# Patient Record
Sex: FEMALE | Race: WHITE | NOT HISPANIC OR LATINO | Employment: UNEMPLOYED | URBAN - NONMETROPOLITAN AREA
[De-identification: names, ages, dates, MRNs, and addresses within clinical notes are randomized per-mention and may not be internally consistent; named-entity substitution may affect disease eponyms.]

---

## 2020-08-13 ENCOUNTER — APPOINTMENT (OUTPATIENT)
Dept: CT IMAGING | Facility: HOSPITAL | Age: 70
End: 2020-08-13

## 2020-08-13 ENCOUNTER — HOSPITAL ENCOUNTER (EMERGENCY)
Facility: HOSPITAL | Age: 70
Discharge: HOME | End: 2020-08-14
Attending: FAMILY MEDICINE | Admitting: EMERGENCY MEDICINE

## 2020-08-13 ENCOUNTER — APPOINTMENT (OUTPATIENT)
Dept: GENERAL RADIOLOGY | Facility: HOSPITAL | Age: 70
End: 2020-08-13

## 2020-08-13 DIAGNOSIS — F23 ACUTE PSYCHOSIS (HCC): Primary | ICD-10-CM

## 2020-08-13 DIAGNOSIS — F51.02 ADJUSTMENT INSOMNIA: ICD-10-CM

## 2020-08-13 LAB
ALBUMIN SERPL-MCNC: 4.55 G/DL (ref 3.5–5.2)
ALBUMIN/GLOB SERPL: 1.4 G/DL
ALP SERPL-CCNC: 173 U/L (ref 39–117)
ALT SERPL W P-5'-P-CCNC: 11 U/L (ref 1–33)
ANION GAP SERPL CALCULATED.3IONS-SCNC: 13.6 MMOL/L (ref 5–15)
APAP SERPL-MCNC: <5 MCG/ML (ref 10–30)
AST SERPL-CCNC: 18 U/L (ref 1–32)
BACTERIA UR QL AUTO: ABNORMAL /HPF
BASOPHILS # BLD AUTO: 0.08 10*3/MM3 (ref 0–0.2)
BASOPHILS NFR BLD AUTO: 0.8 % (ref 0–1.5)
BILIRUB SERPL-MCNC: 0.2 MG/DL (ref 0–1.2)
BILIRUB UR QL STRIP: NEGATIVE
BUN SERPL-MCNC: 8 MG/DL (ref 8–23)
BUN/CREAT SERPL: 8.4 (ref 7–25)
CALCIUM SPEC-SCNC: 9.5 MG/DL (ref 8.6–10.5)
CHLORIDE SERPL-SCNC: 105 MMOL/L (ref 98–107)
CLARITY UR: CLEAR
CO2 SERPL-SCNC: 23.4 MMOL/L (ref 22–29)
COLOR UR: YELLOW
CREAT SERPL-MCNC: 0.95 MG/DL (ref 0.57–1)
CRP SERPL-MCNC: 0.14 MG/DL (ref 0–0.5)
D-LACTATE SERPL-SCNC: 1.7 MMOL/L (ref 0.5–2)
DEPRECATED RDW RBC AUTO: 52.2 FL (ref 37–54)
EOSINOPHIL # BLD AUTO: 0.12 10*3/MM3 (ref 0–0.4)
EOSINOPHIL NFR BLD AUTO: 1.3 % (ref 0.3–6.2)
ERYTHROCYTE [DISTWIDTH] IN BLOOD BY AUTOMATED COUNT: 17.2 % (ref 12.3–15.4)
ETHANOL BLD-MCNC: <10 MG/DL (ref 0–10)
ETHANOL UR QL: <0.01 %
GFR SERPL CREATININE-BSD FRML MDRD: 58 ML/MIN/1.73
GLOBULIN UR ELPH-MCNC: 3.2 GM/DL
GLUCOSE SERPL-MCNC: 99 MG/DL (ref 65–99)
GLUCOSE UR STRIP-MCNC: NEGATIVE MG/DL
HCT VFR BLD AUTO: 38.8 % (ref 34–46.6)
HGB BLD-MCNC: 11.7 G/DL (ref 12–15.9)
HGB UR QL STRIP.AUTO: NEGATIVE
HOLD SPECIMEN: NORMAL
HOLD SPECIMEN: NORMAL
HYALINE CASTS UR QL AUTO: ABNORMAL /LPF
IMM GRANULOCYTES # BLD AUTO: 0.03 10*3/MM3 (ref 0–0.05)
IMM GRANULOCYTES NFR BLD AUTO: 0.3 % (ref 0–0.5)
KETONES UR QL STRIP: NEGATIVE
LEUKOCYTE ESTERASE UR QL STRIP.AUTO: ABNORMAL
LYMPHOCYTES # BLD AUTO: 2.94 10*3/MM3 (ref 0.7–3.1)
LYMPHOCYTES NFR BLD AUTO: 30.8 % (ref 19.6–45.3)
MAGNESIUM SERPL-MCNC: 2.1 MG/DL (ref 1.6–2.4)
MCH RBC QN AUTO: 25.4 PG (ref 26.6–33)
MCHC RBC AUTO-ENTMCNC: 30.2 G/DL (ref 31.5–35.7)
MCV RBC AUTO: 84.3 FL (ref 79–97)
MONOCYTES # BLD AUTO: 0.99 10*3/MM3 (ref 0.1–0.9)
MONOCYTES NFR BLD AUTO: 10.4 % (ref 5–12)
NEUTROPHILS NFR BLD AUTO: 5.38 10*3/MM3 (ref 1.7–7)
NEUTROPHILS NFR BLD AUTO: 56.4 % (ref 42.7–76)
NITRITE UR QL STRIP: NEGATIVE
NRBC BLD AUTO-RTO: 0 /100 WBC (ref 0–0.2)
PH UR STRIP.AUTO: 7 [PH] (ref 5–8)
PLATELET # BLD AUTO: 372 10*3/MM3 (ref 140–450)
PMV BLD AUTO: 9.9 FL (ref 6–12)
POTASSIUM SERPL-SCNC: 3.7 MMOL/L (ref 3.5–5.2)
PROT SERPL-MCNC: 7.7 G/DL (ref 6–8.5)
PROT UR QL STRIP: NEGATIVE
RBC # BLD AUTO: 4.6 10*6/MM3 (ref 3.77–5.28)
RBC # UR: ABNORMAL /HPF
REF LAB TEST METHOD: ABNORMAL
SALICYLATES SERPL-MCNC: <0.3 MG/DL
SODIUM SERPL-SCNC: 142 MMOL/L (ref 136–145)
SP GR UR STRIP: 1.01 (ref 1–1.03)
SQUAMOUS #/AREA URNS HPF: ABNORMAL /HPF
TROPONIN T SERPL-MCNC: <0.01 NG/ML (ref 0–0.03)
TSH SERPL DL<=0.05 MIU/L-ACNC: 1.18 UIU/ML (ref 0.27–4.2)
UROBILINOGEN UR QL STRIP: ABNORMAL
WBC # BLD AUTO: 9.54 10*3/MM3 (ref 3.4–10.8)
WBC UR QL AUTO: ABNORMAL /HPF
WHOLE BLOOD HOLD SPECIMEN: NORMAL
WHOLE BLOOD HOLD SPECIMEN: NORMAL

## 2020-08-13 PROCEDURE — 71045 X-RAY EXAM CHEST 1 VIEW: CPT

## 2020-08-13 PROCEDURE — 80307 DRUG TEST PRSMV CHEM ANLYZR: CPT | Performed by: PHYSICIAN ASSISTANT

## 2020-08-13 PROCEDURE — 80053 COMPREHEN METABOLIC PANEL: CPT | Performed by: PHYSICIAN ASSISTANT

## 2020-08-13 PROCEDURE — 87040 BLOOD CULTURE FOR BACTERIA: CPT | Performed by: PHYSICIAN ASSISTANT

## 2020-08-13 PROCEDURE — 93010 ELECTROCARDIOGRAM REPORT: CPT | Performed by: INTERNAL MEDICINE

## 2020-08-13 PROCEDURE — 70450 CT HEAD/BRAIN W/O DYE: CPT | Performed by: RADIOLOGY

## 2020-08-13 PROCEDURE — 70450 CT HEAD/BRAIN W/O DYE: CPT

## 2020-08-13 PROCEDURE — 85025 COMPLETE CBC W/AUTO DIFF WBC: CPT | Performed by: PHYSICIAN ASSISTANT

## 2020-08-13 PROCEDURE — 99284 EMERGENCY DEPT VISIT MOD MDM: CPT

## 2020-08-13 PROCEDURE — 81001 URINALYSIS AUTO W/SCOPE: CPT | Performed by: PHYSICIAN ASSISTANT

## 2020-08-13 PROCEDURE — 83605 ASSAY OF LACTIC ACID: CPT | Performed by: PHYSICIAN ASSISTANT

## 2020-08-13 PROCEDURE — 25010000002 LORAZEPAM PER 2 MG: Performed by: FAMILY MEDICINE

## 2020-08-13 PROCEDURE — 71045 X-RAY EXAM CHEST 1 VIEW: CPT | Performed by: RADIOLOGY

## 2020-08-13 PROCEDURE — P9612 CATHETERIZE FOR URINE SPEC: HCPCS

## 2020-08-13 PROCEDURE — 84484 ASSAY OF TROPONIN QUANT: CPT | Performed by: PHYSICIAN ASSISTANT

## 2020-08-13 PROCEDURE — 84443 ASSAY THYROID STIM HORMONE: CPT | Performed by: PHYSICIAN ASSISTANT

## 2020-08-13 PROCEDURE — 86140 C-REACTIVE PROTEIN: CPT | Performed by: PHYSICIAN ASSISTANT

## 2020-08-13 PROCEDURE — 96374 THER/PROPH/DIAG INJ IV PUSH: CPT

## 2020-08-13 PROCEDURE — 83735 ASSAY OF MAGNESIUM: CPT | Performed by: PHYSICIAN ASSISTANT

## 2020-08-13 PROCEDURE — 93005 ELECTROCARDIOGRAM TRACING: CPT | Performed by: PHYSICIAN ASSISTANT

## 2020-08-13 RX ORDER — CLONIDINE HYDROCHLORIDE 0.1 MG/1
0.2 TABLET ORAL ONCE
Status: COMPLETED | OUTPATIENT
Start: 2020-08-13 | End: 2020-08-13

## 2020-08-13 RX ORDER — LORAZEPAM 2 MG/ML
1 INJECTION INTRAMUSCULAR ONCE
Status: COMPLETED | OUTPATIENT
Start: 2020-08-13 | End: 2020-08-13

## 2020-08-13 RX ORDER — SODIUM CHLORIDE 0.9 % (FLUSH) 0.9 %
10 SYRINGE (ML) INJECTION AS NEEDED
Status: DISCONTINUED | OUTPATIENT
Start: 2020-08-13 | End: 2020-08-14 | Stop reason: HOSPADM

## 2020-08-13 RX ADMIN — LORAZEPAM 1 MG: 2 INJECTION INTRAMUSCULAR; INTRAVENOUS at 20:19

## 2020-08-13 RX ADMIN — SODIUM CHLORIDE 1000 ML: 9 INJECTION, SOLUTION INTRAVENOUS at 20:19

## 2020-08-13 RX ADMIN — CLONIDINE HYDROCHLORIDE 0.2 MG: 0.1 TABLET ORAL at 20:19

## 2020-08-13 NOTE — ED NOTES
Daughter has pt back in triage, lead nurse contacted, room obtained      Lara Houston, RN  08/13/20 1514

## 2020-08-13 NOTE — ED NOTES
Report to YESSI Diaz, pt is alert, talking to daughter, remains very anxious and restless, got herself out of wc onto stretcher, sheet given     Lara Houston RN  08/13/20 1914

## 2020-08-13 NOTE — ED NOTES
Fall bracelet placed on pt and education given to her and daughter     Rosemarie Lara Samaria, RN  08/13/20 5168

## 2020-08-13 NOTE — ED NOTES
Spoke to lead nurse, attempting to find room placement for pt at this time due to high pt volume     Lara Houston, RN  08/13/20 9230

## 2020-08-13 NOTE — ED NOTES
Spoke with Irina link supervisor @ [facility] and requested records. Faxed medical records release to [phone].      Symes, Heather  08/13/20 9607

## 2020-08-13 NOTE — ED NOTES
Unable to obtain IV access at this time; Ria LARA aware. She states it is ok for pt to go to CT.      Tana Mansfield, RN  08/13/20 1950

## 2020-08-14 VITALS
SYSTOLIC BLOOD PRESSURE: 139 MMHG | BODY MASS INDEX: 22.45 KG/M2 | DIASTOLIC BLOOD PRESSURE: 95 MMHG | HEIGHT: 62 IN | OXYGEN SATURATION: 99 % | RESPIRATION RATE: 20 BRPM | HEART RATE: 108 BPM | WEIGHT: 122 LBS | TEMPERATURE: 98 F

## 2020-08-14 LAB
6-ACETYL MORPHINE: NEGATIVE
AMPHET+METHAMPHET UR QL: NEGATIVE
BARBITURATES UR QL SCN: NEGATIVE
BENZODIAZ UR QL SCN: POSITIVE
BUPRENORPHINE SERPL-MCNC: NEGATIVE NG/ML
CANNABINOIDS SERPL QL: NEGATIVE
COCAINE UR QL: NEGATIVE
METHADONE UR QL SCN: NEGATIVE
OPIATES UR QL: NEGATIVE
OXYCODONE UR QL SCN: NEGATIVE
PCP UR QL SCN: NEGATIVE

## 2020-08-14 NOTE — ED NOTES
Pt resting quietly on stretcher with improving complaints of anxiety.  Pt AAOx4 with no resp distress noted, respirations even and unlabored.  Pt denies any needs at this time.  Skin PWD.  Pt family at bedside. Will continue to monitor and follow plan of care.  Bed rails up x2, bed in lowest position, call light in reach.           Tana Mansfield, RN  08/14/20 3142

## 2020-08-14 NOTE — NURSING NOTE
Patient presents to ED for insomnia. Psych evaluation ordered per physician request. Patient denies SI/HI and AVH. Appetite poor. Patient rates anxiety 10 and depression 10 on a scale from 0-10. A&Ox3. Denies pain.

## 2020-08-14 NOTE — ED PROVIDER NOTES
Subjective   69-year-old female who presents to the ED today with her daughter for a mental health evaluation.  The patient reports that her son  8 years ago.  She is still struggling with grief due to this.  Her daughter states that his birthday is .  She states on  the patient's neighbor gave her some alcohol to try to help her sleep and the patient mixed it with medications for sleep.  The patient does not typically drink alcohol. This caused the patient to overdose and go into cardiac arrest.  She was seen at [facility] for this.  Upon discharge, her provider would no longer prescribe Klonopin.  Her daughter states she was taking 3 mg of Klonopin per day.  She had some leftover and she has been stretching them out to try to make them last.  Her daughter states the last time she had any Klonopin was 2 days ago.  Her provider has tried her on Vraylar and trazodone to try to help with her symptoms but there has been no relief.  The daughter reports that the patient has probably not slept in about 2 weeks.  She states if she does fall asleep but only for about 30 minutes at a time.  The patient denies any suicidal or homicidal ideations.  She states that she has been having frequent falls.  She states that she is having chills and sweats and body aches.  Her daughter states that she has been having issues with C. difficile for about a month.  She was restarted on p.o. vancomycin about 3 days ago.  She was also treated for pancreatitis and pneumonia during an admission to [facility] on .  She continues to have a cough.  The daughter reports that the patient has become more agitated and psychotic.  The patient denies any suicidal or homicidal ideations.      History provided by:  Patient and relative (daughter)  Mental Health Problem   Presenting symptoms: agitation    Presenting symptoms: no suicidal thoughts    Patient accompanied by:  Family member  Degree of incapacity  (severity):  Severe  Onset quality:  Gradual  Duration:  2 weeks  Timing:  Constant  Progression:  Worsening  Chronicity:  New  Context: recent medication change and stressful life event    Relieved by:  Nothing  Worsened by:  Lack of sleep  Associated symptoms: anxiety, appetite change, fatigue, insomnia and irritability    Associated symptoms: no abdominal pain    Risk factors: hx of mental illness        Review of Systems   Constitutional: Positive for appetite change, chills, diaphoresis, fatigue and irritability.   HENT: Negative.    Eyes: Negative.    Respiratory: Positive for cough. Negative for chest tightness, shortness of breath and wheezing.    Cardiovascular: Negative.    Gastrointestinal: Positive for diarrhea. Negative for abdominal pain, nausea and vomiting.   Genitourinary: Negative.    Musculoskeletal: Negative.    Skin: Negative.    Neurological: Negative.    Psychiatric/Behavioral: Positive for agitation and sleep disturbance. Negative for suicidal ideas. The patient is nervous/anxious and has insomnia.    All other systems reviewed and are negative.      History reviewed. No pertinent past medical history.    Allergies   Allergen Reactions   • Lyrica [Pregabalin] Other (See Comments)     Causes her to fall   • Statins Other (See Comments)     aches   • Codeine Rash       History reviewed. No pertinent surgical history.    History reviewed. No pertinent family history.    Social History     Socioeconomic History   • Marital status: Single     Spouse name: Not on file   • Number of children: Not on file   • Years of education: Not on file   • Highest education level: Not on file   Tobacco Use   • Smoking status: Current Every Day Smoker     Packs/day: 2.00     Years: 48.00     Pack years: 96.00   • Smokeless tobacco: Never Used   Substance and Sexual Activity   • Alcohol use: Not Currently   • Drug use: Not Currently   • Sexual activity: Not Currently           Objective   Physical Exam    Constitutional: She is oriented to person, place, and time. She appears well-developed and well-nourished.   HENT:   Head: Normocephalic and atraumatic.   Right Ear: External ear normal.   Left Ear: External ear normal.   Eyes: Pupils are equal, round, and reactive to light. Conjunctivae and EOM are normal.   Neck: Normal range of motion. Neck supple.   Cardiovascular: Regular rhythm, normal heart sounds and intact distal pulses. Tachycardia present.   Pulmonary/Chest: Effort normal and breath sounds normal. No respiratory distress. She has no wheezes. She exhibits no tenderness.   Has a mild cough   Abdominal: Soft. Bowel sounds are normal. There is no tenderness.   Musculoskeletal: Normal range of motion.   Neurological: She is alert and oriented to person, place, and time.   Skin: Skin is warm and dry. Capillary refill takes less than 2 seconds.   Psychiatric: Her mood appears anxious. Her speech is rapid and/or pressured and tangential. She is agitated and hyperactive. Thought content is paranoid. She expresses no homicidal and no suicidal ideation.   Nursing note and vitals reviewed.      Procedures           ED Course  ED Course as of Aug 18 0856   Thu Aug 13, 2020   1920 Case discussed with Dr. Tate    []   2008    FINDINGS:       Extra axial spaces: 14 mm hyperdense and partially calcified extra-axial  mass in the left cerebellar pontine angle region which corresponds to  known history of meningioma. No extra-axial fluid collection or hematoma  identified..  Ventricular system: No evidence of hydrocephalus..  Basal cisterns: Unremarkable. No effacement..  Cerebral parenchyma: No focal areas of mass effect. No white matter  abnormalities. No parenchymal hemorrhage..  Midline shift: None.  Cerebellum: Normal.  Visualized brainstem: Normal.  Vascular system: Carotid calcifications..  Visualized Paranasal sinuses: Right maxillary chronic sinus disease..  Visualized Orbits: Normal.  Calvarium:  Normal.  Sella/skull base/mastoids: Normal.  Visualized soft tissues/scalp: Normal.        IMPRESSION:     1. No CT evidence of acute intracranial abnormality.  2. Compared to previous report, probable stable left CP angle region  extra-axial mass that is 14 mm. This probably represents meningioma  given size stability.  3. Chronic appearing right maxillary sinus disease.   CT Head Without Contrast []   2050 Patient is much calmer, she reports the medication has helped but not enough to make her go to sleep.  Awaiting test results.    [AH]   2055 FINDINGS:      Lungs are aerated.   Heart size, mediastinum, and pulmonary vascularity are unremarkable.   No pneumothorax.   No effusions.   No acute osseous findings.        IMPRESSION:  No radiographic evidence of acute cardiac or pulmonary  disease.   XR Chest 1 View []   2055 Endorsed to Dr. Coronado at shift change    []   Fri Aug 14, 2020   0216 Patient was evaluated by on-call psychiatrist, and they did not feel patient met inpatient criteria at this time.  No indication to admit in the hospital for further evaluation at this time.  Referred the patient to outpatient clinic setting.  Informed family to bring the patient back to the emergency department with any worsening symptoms.  It was discussed in detail with the daughter of the patient prior to discharge at this time.    [ES]      ED Course User Index  [AH] Ria Atkinson PA  [ES] Aston Coronado MD                                           [facility]    Final diagnoses:   Acute psychosis (CMS/HCC)   Adjustment insomnia            Ria Atkinson PA  08/13/20 2104       Ria Atkinson PA  08/18/20 1628

## 2020-08-14 NOTE — ED NOTES
Pt resting quietly on stretcher with eyes closed with no resp distress noted, respirations even and unlabored.  Pt denies any needs at this time.  Skin PWD.  Pt family at bedside. Will continue to monitor and follow plan of care.  Bed rails up x2, bed in lowest position, call light in reach.       Tana Mansfield, RN  08/14/20 4982

## 2020-08-14 NOTE — ED PROVIDER NOTES
Subjective   History of Present Illness    Review of Systems    History reviewed. No pertinent past medical history.    Allergies   Allergen Reactions   • Lyrica [Pregabalin] Other (See Comments)     Causes her to fall   • Statins Other (See Comments)     aches   • Codeine Rash       History reviewed. No pertinent surgical history.    History reviewed. No pertinent family history.    Social History     Socioeconomic History   • Marital status: Single     Spouse name: Not on file   • Number of children: Not on file   • Years of education: Not on file   • Highest education level: Not on file   Tobacco Use   • Smoking status: Current Every Day Smoker     Packs/day: 2.00     Years: 48.00     Pack years: 96.00   • Smokeless tobacco: Never Used   Substance and Sexual Activity   • Alcohol use: Not Currently   • Drug use: Not Currently   • Sexual activity: Not Currently           Objective   Physical Exam    Procedures           ED Course  ED Course as of Aug 14 0217   Thu Aug 13, 2020   1920 Case discussed with Dr. Tate    []   2008    FINDINGS:       Extra axial spaces: 14 mm hyperdense and partially calcified extra-axial  mass in the left cerebellar pontine angle region which corresponds to  known history of meningioma. No extra-axial fluid collection or hematoma  identified..  Ventricular system: No evidence of hydrocephalus..  Basal cisterns: Unremarkable. No effacement..  Cerebral parenchyma: No focal areas of mass effect. No white matter  abnormalities. No parenchymal hemorrhage..  Midline shift: None.  Cerebellum: Normal.  Visualized brainstem: Normal.  Vascular system: Carotid calcifications..  Visualized Paranasal sinuses: Right maxillary chronic sinus disease..  Visualized Orbits: Normal.  Calvarium: Normal.  Sella/skull base/mastoids: Normal.  Visualized soft tissues/scalp: Normal.        IMPRESSION:     1. No CT evidence of acute intracranial abnormality.  2. Compared to previous report, probable stable left  CP angle region  extra-axial mass that is 14 mm. This probably represents meningioma  given size stability.  3. Chronic appearing right maxillary sinus disease.   CT Head Without Contrast []   2050 Patient is much calmer, she reports the medication has helped but not enough to make her go to sleep.  Awaiting test results.    []   2055 FINDINGS:      Lungs are aerated.   Heart size, mediastinum, and pulmonary vascularity are unremarkable.   No pneumothorax.   No effusions.   No acute osseous findings.        IMPRESSION:  No radiographic evidence of acute cardiac or pulmonary  disease.   XR Chest 1 View []   2055 Endorsed to Dr. Coronado at shift change    []   Fri Aug 14, 2020   0216 Patient was evaluated by on-call psychiatrist, and they did not feel patient met inpatient criteria at this time.  No indication to admit in the hospital for further evaluation at this time.  Referred the patient to outpatient clinic setting.  Informed family to bring the patient back to the emergency department with any worsening symptoms.  It was discussed in detail with the daughter of the patient prior to discharge at this time.    [ES]      ED Course User Index  [AH] Ria Atkinson PA  [ES] Aston Coronado MD                                           [facility]    Final diagnoses:   Acute psychosis (CMS/[facility])   Adjustment insomnia            Aston Corondao MD  08/14/20 0217

## 2020-08-14 NOTE — NURSING NOTE
Spoke with Dr. Frey presenting pt clinicals.  MD advised pt does not meet criteria for admission. Careful discussion with patient about discharge. No objective or reported signs of SI or acute distress, or self harm.  Safety plan discussed, patient contracted. Offered inpatient services if felt needed.  Patient declined  Crisis number provided. Advised if change of condition or worsening of symptoms return to ed and/or seek outpt services.

## 2020-08-18 LAB
BACTERIA SPEC AEROBE CULT: NORMAL
BACTERIA SPEC AEROBE CULT: NORMAL

## 2022-08-17 ENCOUNTER — OFFICE VISIT (OUTPATIENT)
Dept: NEUROSURGERY | Facility: CLINIC | Age: 72
End: 2022-08-17

## 2022-08-17 VITALS — HEIGHT: 62 IN | WEIGHT: 132.8 LBS | BODY MASS INDEX: 24.44 KG/M2 | TEMPERATURE: 96.4 F

## 2022-08-17 DIAGNOSIS — R29.2 HYPERREFLEXIA: Primary | ICD-10-CM

## 2022-08-17 DIAGNOSIS — Z72.0 TOBACCO ABUSE: ICD-10-CM

## 2022-08-17 DIAGNOSIS — M43.16 SPONDYLOLISTHESIS OF LUMBAR REGION: ICD-10-CM

## 2022-08-17 DIAGNOSIS — I10 ESSENTIAL HYPERTENSION: ICD-10-CM

## 2022-08-17 DIAGNOSIS — K86.1 CHRONIC PANCREATITIS, UNSPECIFIED PANCREATITIS TYPE: ICD-10-CM

## 2022-08-17 DIAGNOSIS — Z99.81 ON HOME OXYGEN THERAPY: ICD-10-CM

## 2022-08-17 DIAGNOSIS — M48.062 SPINAL STENOSIS, LUMBAR REGION, WITH NEUROGENIC CLAUDICATION: ICD-10-CM

## 2022-08-17 PROCEDURE — 99204 OFFICE O/P NEW MOD 45 MIN: CPT | Performed by: NEUROLOGICAL SURGERY

## 2022-08-17 RX ORDER — METOPROLOL SUCCINATE 25 MG/1
25 TABLET, EXTENDED RELEASE ORAL 2 TIMES DAILY
COMMUNITY
Start: 2022-08-03

## 2022-08-17 RX ORDER — OXYCODONE HYDROCHLORIDE 15 MG/1
15 TABLET ORAL EVERY 6 HOURS PRN
COMMUNITY
Start: 2022-08-03

## 2022-08-17 RX ORDER — CLONAZEPAM 1 MG/1
1 TABLET ORAL
COMMUNITY

## 2022-08-17 RX ORDER — FERROUS SULFATE 325(65) MG
325 TABLET ORAL
COMMUNITY

## 2022-08-17 RX ORDER — TIZANIDINE HYDROCHLORIDE 2 MG/1
2 CAPSULE, GELATIN COATED ORAL 3 TIMES DAILY
COMMUNITY
End: 2022-09-14

## 2022-08-17 RX ORDER — POTASSIUM CHLORIDE 20 MEQ/1
30 TABLET, EXTENDED RELEASE ORAL DAILY
COMMUNITY
Start: 2022-08-03

## 2022-08-17 RX ORDER — TIOTROPIUM BROMIDE AND OLODATEROL 3.124; 2.736 UG/1; UG/1
SPRAY, METERED RESPIRATORY (INHALATION)
COMMUNITY
Start: 2022-06-08

## 2022-08-17 RX ORDER — DILTIAZEM HYDROCHLORIDE 60 MG/1
60 TABLET, FILM COATED ORAL 3 TIMES DAILY
COMMUNITY
Start: 2022-08-11

## 2022-08-17 RX ORDER — CLOPIDOGREL BISULFATE 75 MG/1
75 TABLET ORAL DAILY
COMMUNITY
Start: 2022-08-03

## 2022-08-17 NOTE — PROGRESS NOTES
Subjective     Chief Complaint: Low back pain, leg pain    Patient ID: Irina Cohen is a 71 y.o. female seen for consultation today at the request of  Tiffany HO MD    History of Present Illness    This is a 71-year-old woman who presents to my office with a history of chronic low back pain and radiating pain into her left leg.  She states that her pain has been getting progressively worse in the last few months has been intractable.  She started using a walker about a month ago for some gait instability and some falls.    Her medical history is complicated and significant.  She has a history of a heart attack.  She has COPD and emphysema which required her to take oxygen at home.  She had ARDS several years ago as well.  She has bronchitis 2 or 3 times a year and she is still continuing to smoke.  She states that no one has ever told her that her cigarette smoking is responsible for her lung disease, her heart attack, or her peripheral vascular disease.    The following portions of the patient's history were reviewed and updated as appropriate: allergies, current medications, past family history, past medical history, past social history, past surgical history and problem list.    Family history:   Family History   Problem Relation Age of Onset   • Hypertension Father    • Heart disease Father    • Hypertension Sister    • Hypertension Brother        Social history:   Social History     Socioeconomic History   • Marital status: Single   Tobacco Use   • Smoking status: Current Every Day Smoker     Packs/day: 2.00     Years: 48.00     Pack years: 96.00     Types: Cigarettes   • Smokeless tobacco: Never Used   Vaping Use   • Vaping Use: Never used   Substance and Sexual Activity   • Alcohol use: Not Currently   • Drug use: Not Currently   • Sexual activity: Not Currently       Review of Systems   Constitutional: Negative for activity change, appetite change, chills, diaphoresis, fatigue, fever and unexpected  "weight change.   HENT: Negative for congestion, dental problem, drooling, ear discharge, ear pain, facial swelling, hearing loss, mouth sores, nosebleeds, postnasal drip, rhinorrhea, sinus pressure, sneezing, sore throat, tinnitus, trouble swallowing and voice change.    Eyes: Negative for photophobia, pain, discharge, redness, itching and visual disturbance.   Respiratory: Positive for cough, shortness of breath and wheezing. Negative for apnea, choking, chest tightness and stridor.    Cardiovascular: Negative for chest pain, palpitations and leg swelling.   Gastrointestinal: Positive for abdominal pain and constipation. Negative for abdominal distention, anal bleeding, blood in stool, diarrhea, nausea, rectal pain and vomiting.   Endocrine: Negative for cold intolerance, heat intolerance, polydipsia, polyphagia and polyuria.   Genitourinary: Negative for decreased urine volume, difficulty urinating, dysuria, enuresis, flank pain, frequency, genital sores, hematuria and urgency.   Musculoskeletal: Positive for arthralgias and back pain. Negative for gait problem, joint swelling, myalgias, neck pain and neck stiffness.   Skin: Negative for color change, pallor, rash and wound.   Allergic/Immunologic: Negative for environmental allergies, food allergies and immunocompromised state.   Neurological: Negative for dizziness, tremors, seizures, syncope, facial asymmetry, speech difficulty, weakness, light-headedness, numbness and headaches.   Hematological: Negative for adenopathy. Bruises/bleeds easily.   Psychiatric/Behavioral: Positive for sleep disturbance. Negative for agitation, behavioral problems, confusion, decreased concentration, dysphoric mood, hallucinations, self-injury and suicidal ideas. The patient is nervous/anxious. The patient is not hyperactive.        Objective   Temperature 96.4 °F (35.8 °C), temperature source Infrared, height 157.5 cm (62\"), weight 60.2 kg (132 lb 12.8 oz).  Body mass index is " 24.29 kg/m².    Physical Exam  Constitutional:       General: She is not in acute distress.     Appearance: She is well-developed. She is not diaphoretic.      Comments: Appears older than stated age.  Increased work of breathing with coarse breath sounds.   HENT:      Head: Normocephalic and atraumatic.   Pulmonary:      Effort: Pulmonary effort is normal.   Skin:     General: Skin is warm and dry.   Neurological:      Mental Status: She is alert and oriented to person, place, and time.      Cranial Nerves: No cranial nerve deficit.      Deep Tendon Reflexes: Reflexes abnormal.      Reflex Scores:       Bicep reflexes are 3+ on the right side and 3+ on the left side.       Brachioradialis reflexes are 3+ on the right side and 3+ on the left side.       Patellar reflexes are 3+ on the right side and 3+ on the left side.       Achilles reflexes are 0 on the right side and 0 on the left side.     Comments: Reid sign is present on the left.  Ambulates with walker.  Station and gait are deferred secondary to low back pain.         Assessment & Plan     Independent Review of Radiographic Studies:      Available for my review is a MRI of the lumbar spine which was performed on 7/22/2022.  This demonstrates anterolisthesis of L3 on L4.  The spinal canal is congenitally narrowed and there are broad-based disc bulges at L2-3, L3-4, L4-5, and L5-S1.  She has varying degrees of central canal and lateral recess stenosis which are most notable at L3-4.  She has moderate, diffuse facet arthropathy.    Medical Decision Making:      I am troubled by her asymmetric and relatively brisk muscle stretch reflexes.  Given the degree of congenital stenosis and the degenerative disc disease seen on her MRI of her lumbar spine I am obviously concerned that she has cervical myelopathy.  She is a very poor surgical candidate because of her ongoing tobacco use and her significant cardiac and pulmonary disease.  However, if she does have  radiographic evidence of cervical spinal cord compression she clearly has physical exam findings which would support this diagnosis and she may require surgical intervention even though she is at high risk because of her medical comorbidities.  I ordered a MRI of her cervical spine and I will follow-up with her once the study has been completed to discuss her options moving forward.  I will think she is can to be a candidate for any sort of spinal reconstructive surgery but we will address that once we clear her cervical spine.    Diagnoses and all orders for this visit:    1. Hyperreflexia (Primary)  -     MRI Cervical Spine With & Without Contrast; Future    2. Chronic pancreatitis, unspecified pancreatitis type (HCC)    3. Spinal stenosis, lumbar region, with neurogenic claudication    4. Spondylolisthesis of lumbar region    5. On home oxygen therapy    6. Tobacco abuse    7. Essential hypertension        No follow-ups on file.           This document signed by SATURNINO Manzanares MD August 17, 2022 15:02 EDT

## 2022-08-24 ENCOUNTER — TELEPHONE (OUTPATIENT)
Dept: NEUROSURGERY | Facility: CLINIC | Age: 72
End: 2022-08-24

## 2022-08-24 DIAGNOSIS — M43.16 SPONDYLOLISTHESIS OF LUMBAR REGION: Primary | ICD-10-CM

## 2022-08-24 NOTE — TELEPHONE ENCOUNTER
Pt's daughter, Belle called:  [facility] told them they did not get the order and asked that it be refaxed to [phone]. Please advise! Thanks!      Pt contact: [phone]

## 2022-08-29 NOTE — TELEPHONE ENCOUNTER
Caller: [email or web address]    Relationship: Emergency Contact    Best call back number: [phone]    What orders are you requesting (i.e. lab or imaging): LAB ORDERS FOR CREATININE LEVELS    Where will you receive your lab/imaging services: [facility] IN [place]    Additional notes: PLEASE FAX [phone].    MRI IS SCHEDULED FOR 9/1/2022.  THEY ARE ALSO ASKING IF MRI HAS A PRIOR AUTH DONE IF SO PLEASE FAX THIS AS WELL.

## 2022-08-30 NOTE — TELEPHONE ENCOUNTER
Please see encounter below. Would the lab orders for a creatinine level come from us or another physician?

## 2022-09-14 ENCOUNTER — OFFICE VISIT (OUTPATIENT)
Dept: NEUROSURGERY | Facility: CLINIC | Age: 72
End: 2022-09-14

## 2022-09-14 VITALS
SYSTOLIC BLOOD PRESSURE: 140 MMHG | TEMPERATURE: 97.1 F | HEIGHT: 62 IN | DIASTOLIC BLOOD PRESSURE: 86 MMHG | WEIGHT: 135 LBS | BODY MASS INDEX: 24.84 KG/M2

## 2022-09-14 DIAGNOSIS — M51.36 LUMBAR DEGENERATIVE DISC DISEASE: Primary | ICD-10-CM

## 2022-09-14 PROCEDURE — 99214 OFFICE O/P EST MOD 30 MIN: CPT | Performed by: NEUROLOGICAL SURGERY

## 2022-09-14 RX ORDER — TIZANIDINE 4 MG/1
4 TABLET ORAL EVERY 12 HOURS PRN
COMMUNITY
Start: 2022-09-07

## 2022-09-14 RX ORDER — EVOLOCUMAB 140 MG/ML
INJECTION, SOLUTION SUBCUTANEOUS
COMMUNITY
Start: 2022-09-01

## 2022-09-14 NOTE — PROGRESS NOTES
Subjective     Chief Complaint: Follow-up neck MRI    Patient ID: Irina Cohen is a 71 y.o. female is here today for follow-up.    History of Present Illness    This is a 71-year-old woman who I saw in my office several weeks ago for low back pain with bilateral sciatica.  She is a poor surgical candidate because of her medical comorbidities and active tobacco abuse.  She did have some asymmetric reflexes which were slightly more exaggerated than what I would expect for a 71-year-old, so I ordered a MRI of the cervical spine.  She presents today to discuss the results of the study.  She reports no appreciable change in her symptoms since her last visit.    The following portions of the patient's history were reviewed and updated as appropriate: allergies, current medications, past family history, past medical history, past social history, past surgical history and problem list.    Family history:   Family History   Problem Relation Age of Onset   • Hypertension Father    • Heart disease Father    • Hypertension Sister    • Depression Sister    • Hypertension Brother    • Arthritis Daughter         JRA RA       Social history:   Social History     Socioeconomic History   • Marital status: Single   Tobacco Use   • Smoking status: Current Every Day Smoker     Packs/day: 2.00     Years: 48.00     Pack years: 96.00     Types: Cigarettes   • Smokeless tobacco: Never Used   Vaping Use   • Vaping Use: Never used   Substance and Sexual Activity   • Alcohol use: Not Currently   • Drug use: Not Currently   • Sexual activity: Not Currently       Review of Systems   Constitutional: Negative for activity change, appetite change, chills, diaphoresis, fatigue, fever and unexpected weight change.   HENT: Negative for congestion, dental problem, drooling, ear discharge, ear pain, facial swelling, hearing loss, mouth sores, nosebleeds, postnasal drip, rhinorrhea, sinus pressure, sneezing, sore throat, tinnitus, trouble swallowing and  "voice change.    Eyes: Negative for photophobia, pain, discharge, redness, itching and visual disturbance.   Respiratory: Positive for cough, shortness of breath and wheezing. Negative for apnea, choking, chest tightness and stridor.    Cardiovascular: Negative for chest pain, palpitations and leg swelling.   Gastrointestinal: Positive for abdominal pain and constipation. Negative for abdominal distention, anal bleeding, blood in stool, diarrhea, nausea, rectal pain and vomiting.   Endocrine: Negative for cold intolerance, heat intolerance, polydipsia, polyphagia and polyuria.   Genitourinary: Negative for decreased urine volume, difficulty urinating, dysuria, enuresis, flank pain, frequency, genital sores, hematuria and urgency.   Musculoskeletal: Positive for arthralgias and back pain. Negative for gait problem, joint swelling, myalgias, neck pain and neck stiffness.   Skin: Negative for color change, pallor, rash and wound.   Allergic/Immunologic: Negative for environmental allergies, food allergies and immunocompromised state.   Neurological: Negative for dizziness, tremors, seizures, syncope, facial asymmetry, speech difficulty, weakness, light-headedness, numbness and headaches.   Hematological: Negative for adenopathy. Bruises/bleeds easily.   Psychiatric/Behavioral: Positive for sleep disturbance. Negative for agitation, behavioral problems, confusion, decreased concentration, dysphoric mood, hallucinations, self-injury and suicidal ideas. The patient is nervous/anxious. The patient is not hyperactive.        Objective   Blood pressure 140/86, temperature 97.1 °F (36.2 °C), temperature source Infrared, height 157.5 cm (62\"), weight 61.2 kg (135 lb).  Body mass index is 24.69 kg/m².    Physical Exam  Constitutional:       General: She is not in acute distress.     Appearance: She is well-developed. She is not diaphoretic.   HENT:      Head: Normocephalic and atraumatic.   Pulmonary:      Effort: Pulmonary " effort is normal.   Skin:     General: Skin is warm and dry.   Neurological:      Mental Status: She is alert and oriented to person, place, and time.      Cranial Nerves: No cranial nerve deficit.         Assessment & Plan     Independent Review of Radiographic Studies:      Her MRI of the cervical spine is unremarkable from the standpoint of compressive myelopathy.  She does have diffuse degenerative disc disease but I do not appreciate any obvious radiographic impingement of the cervical spinal cord or any cervical nerve roots.    Medical Decision Making:      Her main complaint at this point is back pain and unfortunately did not have any surgical options for her.  I carefully reviewed the signs and symptoms of lumbosacral radiculopathy and cauda equina with her.  If her radicular symptoms become unmanageable with the conservative treatment she is currently undergoing, then I would recommend performing a lumbar myelogram to see if there is a minimally invasive option for providing some relief with regards to her radicular pain.  She would obviously need clearance from her pulmonologist given her lung disease.  We will be happy to follow-up with her on an as-needed basis moving forward.    Before her next visit with me however I would like her to have a lumbar myelogram if she is interested in pursuing treatment.    There are no diagnoses linked to this encounter.    No follow-ups on file.           This document signed by SATURNINO Manzanares MD September 14, 2022 11:04 EDT

## 2023-11-08 NOTE — NURSING NOTE
[facility] Progress Note           Teri Fulton Patient Status:  Inpatient    1964 MRN [id number]   Location [address] SURG Attending Yony Leiva MD   Hosp Day # 2 PCP Karely Lakhani CNP     Subjective:      Patient is doing okay. No CV complaints.  Worsening significant left upper back tenderness.  Blood pressure is better controlled.    Telemetry review.      Review of Systems    General: No fever or chills, No loss of appetite.    RS: No hemoptysis  GI: No melena or hematochezia  : No urinary disturbance  Derm: No skin disorders   Heme: No blood dyscrasias.  Remainder of 12 point systems reviewed and negative (or as mentioned in HPI)      Objective:     Medications:  Current Facility-Administered Medications   Medication Dose Route Frequency Provider Last Rate Last Admin   • apixaBAN (ELIQUIS) tablet 10 mg  10 mg Oral 2 times per day Yony Leiva MD   10 mg at 23    Followed by   • [START ON 2023] apixaBAN (ELIQUIS) tablet 5 mg  5 mg Oral 2 times per day Yony Leiva MD       • sacubitril-valsartan (ENTRESTO) 24-26 MG per tablet 1 tablet  1 tablet Oral BID Soledad Nick MD   1 tablet at 23   • metoPROLOL succinate (TOPROL-XL) ER tablet 100 mg  100 mg Oral Daily Caroline Adkins MD   100 mg at 23 0839   • hydrALAZINE (APRESOLINE) tablet 25 mg  25 mg Oral Q8H Caroline Adkins MD   25 mg at 23 0624   • isosorbide dinitrate (ISORDIL) tablet 10 mg  10 mg Oral TID WC Caroline Adkins MD   10 mg at 2339   • sodium chloride 0.9 % injection 10 mL  10 mL Injection 2 times per day Soledad Nick MD   10 mL at 23   • perflutren lipid microsphere (DEFINITY) injection 2 mL  2 mL Intravenous Once Rsosana Somers MD       • amitriptyline (ELAVIL) tablet 50 mg  50 mg Oral QHS Soledad Nick MD   50 mg at 23   • atorvastatin (LIPITOR) tablet 80 mg  80 mg Oral Nightly Soledad Nick MD   80 mg at 23  Assessment completed in ED room 3. Swept for safety. Cared handed back over to primary RN.    2128   • clopidogrel (PLAVIX) tablet 75 mg  75 mg Oral Daily Soledad Nick MD   75 mg at 11/08/23 0839   • insulin glargine (LANTUS) injection 15 Units  15 Units Subcutaneous Nightly Soledad Nick MD   15 Units at 11/06/23 2137   • budesonide-formoterol (SYMBICORT) 160-4.5 MCG/ACT inhaler 2 puff  2 puff Inhalation BID Resp Soledad Nick MD   2 puff at 11/08/23 0849   • sodium chloride 0.9 % injection 2 mL  2 mL Intracatheter 2 times per day Soledad Nick MD   2 mL at 11/08/23 0840   • insulin lispro (ADMELOG,HumaLOG) - Correction Dose   Subcutaneous Nightly Soledad Nick MD   2 Units at 11/06/23 2137   • insulin lispro (ADMELOG,HumaLOG) - Correction Dose   Subcutaneous TID WC Soledad Nick MD   4 Units at 11/08/23 0839      Current Facility-Administered Medications   Medication Dose Route Frequency Provider Last Rate Last Admin      Current Facility-Administered Medications   Medication Dose Route Frequency Provider Last Rate Last Admin   • oxyCODONE (IMM REL) (ROXICODONE) tablet 15 mg  15 mg Oral Q4H PRN Yony Leiva MD       • HYDROmorphone (DILAUDID) injection 0.4 mg  0.4 mg Intravenous Q3H PRN Yony Leiva MD   0.4 mg at 11/08/23 0906   • sodium chloride 0.9 % injection 10 mL  10 mL Injection PRN Soledad Nick MD       • hydrALAZINE (APRESOLINE) injection 10 mg  10 mg Intravenous Q4H PRN Soledad Nick MD   10 mg at 11/05/23 1633   • sodium chloride (NORMAL SALINE) 0.9 % bolus 100-200 mL  100-200 mL Intravenous PRN Giuseppe Lay MD       • labetalol (NORMODYNE) injection 20 mg  20 mg Intravenous Q4H PRN Soledad Nick MD   20 mg at 11/05/23 0550   • albuterol inhaler 2 puff  2 puff Inhalation Q4H PRN Soledad Nick MD       • cyclobenzaprine (FLEXERIL) tablet 10 mg  10 mg Oral TID PRN Soledad Nick MD       • diphenhydrAMINE (BENADRYL) capsule 25 mg  25 mg Oral Q4H PRN Soledad Nick MD       • guaiFENesin-DM (ROBITUSSIN DM) 100-10 MG/5ML syrup 10 mL  10 mL Oral Q4H PRN Soledad Nick MD   10 mL at 11/08/23  0624   • melatonin tablet 3 mg  3 mg Oral QHS PRN Soledad Nick MD   3 mg at 11/06/23 2130   • dextrose 50 % injection 25 g  25 g Intravenous PRN Soledad Nick MD       • dextrose 50 % injection 12.5 g  12.5 g Intravenous PRN Soledad Nick MD       • glucagon (GLUCAGEN) injection 1 mg  1 mg Intramuscular PRN Soledad Nick MD       • dextrose (GLUTOSE) 40 % gel 15 g  15 g Oral PRN Soledad Nick MD       • dextrose (GLUTOSE) 40 % gel 30 g  30 g Oral PRN Soledad Nick MD       • sodium chloride 0.9 % flush bag 25 mL  25 mL Intravenous PRN Soledad Nick MD       • ondansetron (ZOFRAN) injection 4 mg  4 mg Intravenous BID PRN Soledad Nick MD   4 mg at 11/06/23 2236   • [Held by provider] acetaminophen (TYLENOL) tablet 650 mg  650 mg Oral Q4H PRN Soledad Nick MD   650 mg at 11/07/23 0528   • polyethylene glycol (MIRALAX) packet 17 g  17 g Oral Daily PRN Soledad Nick MD       • docusate sodium-sennosides (SENOKOT S) 50-8.6 MG 2 tablet  2 tablet Oral Daily PRN Soledad Nick MD       • bisacodyl (DULCOLAX) suppository 10 mg  10 mg Rectal Daily PRN Soledad Nick MD       • sodium chloride 0.9 % flush bag 25 mL  25 mL Intravenous PRN Soledad Nick MD       • sodium chloride (NORMAL SALINE) 0.9 % bolus 100-200 mL  100-200 mL Intravenous PRN Giuseppe Lay MD       • alteplase (CATHFLO ACTIVASE) injection 2 mg  2 mg Intracatheter PRN Giuseppe Lay MD   2 mg at 11/04/23 1850        Allergies:   ALLERGIES:  No Known Allergies     Physical Exam:  Vital Last Value 24 Hour Range   Temperature 98.1 °F (36.7 °C) (11/08/23 0719) Temp  Min: 97.3 °F (36.3 °C)  Max: 98.4 °F (36.9 °C)   Pulse 89 (11/08/23 0719) Pulse  Min: 71  Max: 94   Respiratory 19 (11/08/23 0906) Resp  Min: 14  Max: 20   Non-Invasive  Blood Pressure 130/82 (11/08/23 0719) BP  Min: 130/82  Max: 155/100   Pulse Oximetry 95 % (11/08/23 0848) SpO2  Min: 92 %  Max: 98 %   Arterial   Blood Pressure   No data recorded     Tele: Normal sinus rhythm    Intake/Output:      Intake/Output Summary (Last 24 hours) at 11/8/2023 1154  Last data filed at 11/7/2023 1915  Gross per 24 hour   Intake --   Output 1480 ml   Net -1480 ml       Weight    11/06/23 0600 11/06/23 0902 11/07/23 0530 11/08/23 0700   Weight: 88.9 kg (195 lb 15.8 oz) 88.9 kg (195 lb 15.8 oz) 86.5 kg (190 lb 11.2 oz) 87.1 kg (192 lb 0.3 oz)        GENERAL: No apparent distress  HEENT: Normocephalic.  Neck:  Supple neck.   Oral mucosa : Pink and moist.    Endocrine: There is no goiter.  CVS: Regular rate and rhythm.  Normal first and second heart tones.   Lung fields: Clear to auscultation bilaterally.   GI: Soft. Nontender, nondistended.    Lower extremity: No cyanosis, clubbing or edema.   Peripheral vascular: Both lower extremities are warm and well perfused.    Neuro: Awake and alert.  Nonfocal examination.  Psych: Appropriate mood and affect  Integumentary: Warm and Dry      Clinical Data:   (PERSONALLY REVIEWED)    Labs    CBC  Recent Labs   Lab 11/08/23  0621 11/07/23  0450 11/06/23  0440   WBC 14.8* 10.3 8.2   HCT 30.0* 26.9* 23.9*   HGB 10.2* 9.4* 8.1*    188 162       CMP  Recent Labs   Lab 11/08/23  0621 11/05/23  0610 11/04/23  0930   SODIUM 135 141 141   POTASSIUM 3.9 3.6 3.6   CHLORIDE 99 106 106   CO2 24 21 21   GLUCOSE 182* 138* 174*   BUN 36* 55* 69*   CREATININE 3.95* 4.21* 4.69*   CALCIUM 9.4 7.7* 8.8   TOTPROTEIN  --   --  7.9   ALBUMIN  --   --  3.0*   BILIRUBIN  --   --  0.2   AST  --   --  17   GPT  --   --  16   ALKPT  --   --  87       Cardiac Labs  Recent Labs   Lab 11/06/23  0440 11/05/23  0610 11/04/23  1711 11/04/23  0930   CPK  --   --   --  178   TSH  --  0.797  --   --    HTROPI 108* 172* 148* 102*   NTPROB  --   --   --  55,361*       Lipid Panel  Recent Labs   Lab 11/04/23  0930   CHOLESTEROL 202*   HDL 53   CALCLDL 121   TRIGLYCERIDE 141       Coags  Recent Labs   Lab 11/08/23  0621 11/07/23  1741 11/07/23  0904 11/05/23  0610 11/04/23 2128   INR  --   --   --   --  1.0   PTT  38* 54* 49*   < > 29    < > = values in this interval not displayed.       ABG  No results found    Imaging    ECG:   Encounter Date: 23   Electrocardiogram 12-Lead   Result Value    Ventricular Rate EKG/Min (BPM) 113    Atrial Rate (BPM) 113    NJ-Interval (MSEC) 156    QRS-Interval (MSEC) 92    QT-Interval (MSEC) 352    QTc 483    P Axis (Degrees) 54    R Axis (Degrees) -18    T Axis (Degrees) 74    REPORT TEXT      Sinus tachycardia  Nonspecific T wave abnormality  Abnormal ECG  When compared with ECG of  23-SEP-2023 13:39,  Criteria for  Septal infarct  are no longer  present  Confirmed by CONNIE ALVAREZ JESUS ([id number]) on 2023 11:48:24 AM          Echocardiogram:  Results for orders placed during the hospital encounter of 23    TRANSTHORACIC ECHO (TTE) COMPLETE W/ W/O IMAGING AGENT    Narrative  *[facility]*  [address] [place] [id number]  [phone]  Transthoracic Echocardiogram (TTE)    Patient: Teri Fulton  Study Date/Time:            2023 8:24AM  MRN:     [id number]     FIN#:                       [id number]  :     1964   Ht/Wt:                      149cm 95.3kg  Age:     58           BSA/BMI:                    2.04m^2 42.9kg/m^2  Gender:  F            Baseline BP:                147 / 78  Ordering Physician:      Herbie Bright    Referring Physician:     Herbie Bright    Attending Physician:     Abigail Duenas    Diagnostic Physician:    Drake Fernandez MD  Sonographer:             Albertina Crawford    ------------------------------------------------------------------------------  INDICATIONS:   Congestive heart failure.    ------------------------------------------------------------------------------  STUDY CONCLUSIONS  SUMMARY:    1.  Left ventricle: The cavity size is normal. Wall thickness is mildly  increased. There is concentric hypertrophy. Systolic function is  moderately reduced. The ejection fraction was measured by 3D  assessment.  Doppler parameters are consistent with abnormal left ventricular  relaxation and increased filling pressure (grade 2 diastolic dysfunction).  The ejection fraction is 40%.  2.  Regional wall motion: There is moderate hypokinesis of the entire  inferior, basal-mid inferolateral, basal-mid anterolateral, and apical  lateral walls.  3.  Aortic valve: There is trace regurgitation.  4.  Mitral valve: There is moderate regurgitation.  5.  Left atrium: The atrium is mildly dilated.  6.  Right ventricle: The cavity size is normal. Wall thickness is normal.  Systolic function is normal.  7.  Right atrium: The estimated central venous pressure is 3mm Hg.  8.  Tricuspid valve: There is moderate regurgitation.  9.  Pulmonic valve: There is trace regurgitation.  10. Pulmonary arteries: The peak systolic pressure is 39mm Hg.    ------------------------------------------------------------------------------  STUDY DATA:   Procedure:  A transthoracic echocardiogram was performed. Image  quality was adequate. The study was technically limited due to body habitus.  Intravenous contrast (Definity 2ml) was administered.  M-mode, complete 2D,  complete spectral Doppler, and color Doppler.  Study status:  Routine.  Study  completion:  There were no complications.    FINDINGS    LEFT VENTRICLE:  The cavity size is normal. Wall thickness is mildly  increased. There is concentric hypertrophy. Systolic function is moderately  reduced.  There is moderate hypokinesis of the entire inferior, basal-mid  inferolateral, basal-mid anterolateral, and apical lateral walls.      The  ejection fraction was measured by 3D assessment. The ejection fraction is 40%.  Doppler parameters are consistent with abnormal left ventricular relaxation  and increased filling pressure (grade 2 diastolic dysfunction).    AORTIC VALVE:  The annulus is normal. The valve is trileaflet. Velocity is  within the normal range. There is no stenosis. There is trace  regurgitation.  The mean systolic gradient is 5mm Hg. The peak systolic gradient is 9mm Hg.  The LVOT to aortic valve VTI ratio is 0.73. The valve area is 2.1cm^2. The  valve area index is 1.01cm^2/m^2. The ratio of LVOT to aortic valve peak  velocity is 0.83. The valve area is 2.4cm^2. The valve area index is  1.15cm^2/m^2.    AORTA:  Aortic root: The root is 2.8cm diameter.  Ascending aorta: The vessel is 3.2cm diameter.    MITRAL VALVE:  The annulus is normal. Inflow velocity is within the normal  range. There is no evidence for stenosis. There is moderate regurgitation. The  mean diastolic gradient is 4mm Hg. The peak diastolic gradient is 9mm Hg. The  valve area by pressure half-time is 4.7cm^2. The valve area index by pressure  half-time is 2.29cm^2/m^2. The valve area (LVOT continuity) is 1.6cm^2. The  valve area index (LVOT continuity) is 0.79cm^2/m^2.    ATRIAL SEPTUM:  The septum is normal.    LEFT ATRIUM:  The atrium is mildly dilated.    RIGHT VENTRICLE:  The cavity size is normal. Wall thickness is normal.  Systolic function is normal.       The RV pressure during systole is 39mm Hg.    PULMONIC VALVE:   Not well visualized. There is trace regurgitation.    TRICUSPID VALVE:  The valve is structurally normal. There is moderate  regurgitation.    PULMONARY ARTERIES:  The main pulmonary artery is normal-sized.    RIGHT ATRIUM:  The atrium is normal in size.       The estimated central  venous pressure is 3mm Hg.    PERICARDIUM:   There is no pericardial effusion.    SYSTEMIC VEINS:  Inferior vena cava: The IVC is normal-sized. The IVC is normal-sized.  Respirophasic diameter changes are in the normal range (>= 50%).    ------------------------------------------------------------------------------  Measurements    Left ventricle           Value          Ref        10/23/2022  Aortic valve continued     Value          Ref       10/23/2022  LORENZO, LAX chord       (N) 4.8   cm       3.8 - 5.2  4.3         Peak  grad, S               9     mm Hg    --------- 16  ESD, LAX chord       (H) 4.1   cm       2.2 - 3.5  3.0         LVOT/AV, VTI ratio         0.73           --------- 0.84  LORENZO/bsa, LAX chord   (N) 2.3   cm/m^2   2.3 - 3.1  2.1         KIARRA, VTI                   2.1   cm^2     --------- 2.9  ESD/bsa, LAX chord   (N) 2.0   cm/m^2   1.3 - 2.1  1.5         KIARRA/bsa, VTI               1.01  cm^2/m^2 --------- 1.41  PW, ED, LAX          (H) 1.2   cm       0.6 - 0.9  1.3         LVOT/AV, Vpeak ratio       0.83           --------- ----------  IVS, ED              (H) 1.2   cm       0.6 - 0.9  1.4         KIARRA, Vmax                  2.4   cm^2     --------- 2.7  PW, ED               (H) 1.2   cm       0.6 - 0.9  1.3         KIARRA/bsa, Vmax              1.15  cm^2/m^2 --------- 1.29  IVS/PW, ED               1              ---------- 1.08  EDV                  (H) 111   ml       46 - 106   80          Mitral valve               Value          Ref       10/23/2022  ESV                  (H) 47    ml       14 - 42    27          Shakira diam                   2.8   cm       --------- ----------  EF                   (L) 40    %        54 - 74    58          Mean v, D                  0.89  m/sec    --------- ----------  SV                       54    ml       ---------- 48          Peak E                     1.51  m/sec    --------- 0.82  EDV/bsa              (N) 54    ml/m^2   29 - 61    38          Peak A                     0.72  m/sec    --------- 1.12  ESV/bsa              (N) 23    ml/m^2   8 - 24     13          VTI leaflet coapt          33.1  cm       --------- ----------  SV/bsa                   26    ml/m^2   ---------- 23          MiV/LVOT VTI               1.8            --------- ----------  E' lat shakira TDI     (L) 5.2   cm/sec   >=10.0     7.4         Decel time                 169   ms       --------- 123  E/e', lat shakira TDI   (H) 29             <=13       11          PHT                        47    ms       ---------  36  E', med maria teresa, TDI     (L) 4.2   cm/sec   >=7.0      4.0         Mean grad, D               4     mm Hg    --------- ----------  E/e', med maria teresa, TDI       36             ---------- 20          Peak grad, D               9     mm Hg    --------- 3  E', avg, TDI             4.73  cm/sec   ---------- 5.715       Peak E/A ratio             2.1            --------- 0.7  E/e', avg, TDI       (H) 32             <=14       14          A-VTI                      33.1  cm       --------- ----------  MVA, PHT                   4.7   cm^2     --------- 6.1  LVOT                     Value          Ref        10/23/2022  MVA/bsa, PHT               2.29  cm^2/m^2 --------- 2.96  Diam, S                  1.9   cm       ---------- 2.1         MVA, LVOT cont             1.6   cm^2     --------- ----------  Area                     2.8   cm^2     ---------- 3.5         MVA/bsa, LVOT cont         0.79  cm^2/m^2 --------- ----------  Peak jes, S              1.27  m/sec    ---------- 1.52        MR vol, LVOT cont          16    ml       --------- ----------  Peak grad, S             6     mm Hg    ---------- 9           MR peak v                  5.45  m/sec    --------- ----------  Peak LV-LA grad S          119   mm Hg    --------- ----------  Right ventricle          Value          Ref        10/23/2022  Max MR v                   4.78  m/sec    --------- ----------  LORENZO, LAX                 3.1   cm       ---------- 3.0         Peak LV-LA grad S          91    mm Hg    --------- ----------  TAPSE, MM            (L) 1.6   cm       >=1.7      2.5         Regurg VTI                 146.0 cm       --------- ----------  Pressure, S              39    mm Hg    ---------- ----------  ERO, PISA                  0.1   cm^2     --------- ----------  S' lateral           (N) 10.8  cm/sec   6.0 - 13.4 17.7  Tricuspid valve            Value          Ref       10/23/2022  Left atrium              Value          Ref        10/23/2022  TR peak v               (H) 3     m/sec    <=2.8     ----------  AP dim, ES           (H) 5.1   cm       2.7 - 3.8  3.6         Peak RV-RA grad, S         36    mm Hg    --------- ----------  AP dim index, ES     (H) 2.5   cm/m^2   1.5 - 2.3  1.7  Area ES, A4C         (H) 24    cm^2     <=20       21          Aortic root                Value          Ref       10/23/2022  Area/bsa ES, A4C         11.49 cm^2/m^2 ---------- 9.98        Root diam, ED          (N) 2.8   cm       2.7 - 4.2 2.6  Area ES, A2C             24    cm^2     ---------- 20          S-T junct diam, ED     (L) 1.9   cm       2.0 - 3.2 ----------  Area/bsa ES, A2C         11.64 cm^2/m^2 ---------- 9.69        S-T junct diam/bsa, ED (L) 0.9   cm/m^2   1.1 - 1.9 ----------  Vol, ES, 1-p A4C     (H) 65    ml       22 - 52    63  Vol/bsa, ES, 1-p A4C (N) 32    ml/m^2   11 - 40    30          Ascending aorta            Value          Ref       10/23/2022  Vol, ES, 1-p A2C     (H) 68    ml       22 - 52    54          AAo AP diam, ED        (N) 2.5   cm       1.9 - 3.5 ----------  Vol/bsa, ES, 1-p A2C (N) 33    ml/m^2   13 - 40    26          AAo AP diam/bsa, ED    (N) 1.2   cm/m^2   1.0 - 2.2 ----------  Vol, ES, 2-p             67    ml       ---------- 61  Vol/bsa, ES, 2-p     (N) 33    ml/m^2   16 - 34    30          Pulmonary artery           Value          Ref       10/23/2022  Pressure, S                39    mm Hg    --------- ----------  Aortic valve             Value          Ref        10/23/2022  Peak v, S                1.5   m/sec    ---------- ----------  Systemic veins             Value          Ref       10/23/2022  Mean v, S                1.02  m/sec    ---------- ----------  Estimated CVP              3     mm Hg    --------- ----------  Mean grad, S             5     mm Hg    ---------- 7  Legend:  (L)  and  (H)  marianna values outside specified reference range.    (N)  marks values inside specified reference range.    Prepared and electronically  signed by  Drake Fernandez MD  07/08/2023 12:06       Cardiac cath:   No results found for this or any previous visit.    Assessment and Plan:      Hypertensive urgency  BP was 236/112 on admission.  -Likely due to missing HD session, not sure about medication compliance.  -Blood pressures controlled  -Continue current regimen at present at discharge     Fluid overload in ESRD patient with Pulmonary edema  ESRD on HD (TTS)  -Nephrology is following.  -Volume management with hemodialysis     Acute on chronic heart failure severely reduced EF 15%:  -Patient presented with CHF exacerbation, previous echo showed LVEF 40%, on this admission repeat echo showed LVEF 15%.  -proBNP elevated 04731 on admission.  -Etiology is unclear, likely nonischemic in nature.  -Optimize fluid status via hemodialysis.  -We will optimize GDMT.  -Entresto 24/26 mg twice daily, given the ESRD, the dose can be titrated after 4 weeks.  -Increased Toprol to 100 mg daily.  -Continue on hydralazine 25 mg 3 times daily also is for daily  -Discussed with the patient the diagnosis, GDMT need to be optimized.  -Had an angiogram in February at [facility] which showed patent LAD and RCA and high thrombus burden and OM branch she was treated with heparin and Integrilin and was subsequently to come to the Cath Lab back for PCI and stenting of the OM, she also had a stress test in June with no ischemia her troponin is mildly elevated, no further ischemic work-up currently indicated  -We will continue present management at discharge.  Follow-up as outpatient for GDMT optimization.     Coronary artery disease  -Patient denies any chest pain  -EKG reviewed  -She had coronary angiography in February this year at [facility] her initial diagnostic angiogram was done which showed a thrombotic lesion in the OM she was placed on Integrilin and heparin and was subsequently taken to the Cath Lab later with PCI of the OM, she does not have any  significant obstructive disease in her LAD or RCA  -Recommend Plavix and Eliquis upon discharge     Pulmonary embolism:  -CTA on 9/28 1/2023 showed segmental and subsegmental PE with no RV strain.  -RV size and function is normal.  -Will need to be on Eliquis 5 mg twice daily     Chronic normocytic anemia  Anemia of ESRD  -Follow-up with PCP for further evaluation of anemia.     Type 2 IDDM with CKD  -Hemoglobin A1C (%) Date Value 09/23/2023     6.7 (H)  -Continue home glargine             Accuchecks and PRN lispro while hospitalized     COPD  -Continue home inhalers     Morbid obesity, BMI 43.64  -Encourage weight loss efforts     Osteoarthritis of bilateral knees  -Supportive care and management     Constipation  -Bowel regimen as needed     Tobacco use  -Cessation was advised.     Thank you for allowing us to participate in this patient's care.  Please do not hesitate to call with any questions or concerns.